# Patient Record
Sex: FEMALE | URBAN - METROPOLITAN AREA
[De-identification: names, ages, dates, MRNs, and addresses within clinical notes are randomized per-mention and may not be internally consistent; named-entity substitution may affect disease eponyms.]

---

## 2020-03-14 ENCOUNTER — OFFICE VISIT (OUTPATIENT)
Dept: URBAN - METROPOLITAN AREA CLINIC 22 | Facility: CLINIC | Age: 70
End: 2020-03-14
Payer: COMMERCIAL

## 2020-03-14 DIAGNOSIS — H11.421 CONJUNCTIVAL EDEMA, RIGHT EYE: Primary | ICD-10-CM

## 2020-03-14 PROCEDURE — 99204 OFFICE O/P NEW MOD 45 MIN: CPT | Performed by: OPTOMETRIST

## 2020-03-14 RX ORDER — TOBRAMYCIN AND DEXAMETHASONE 3; 1 MG/ML; MG/ML
SUSPENSION/ DROPS OPHTHALMIC
Qty: 1 | Refills: 0 | Status: ACTIVE
Start: 2020-03-14

## 2020-03-14 ASSESSMENT — INTRAOCULAR PRESSURE
OD: 16
OS: 18

## 2020-03-14 NOTE — IMPRESSION/PLAN
Impression: Conjunctival edema, right eye: H11.421.  Plan: start tobradex tid od and cold compresses for 5 min 3 times a day